# Patient Record
Sex: MALE | Race: WHITE | NOT HISPANIC OR LATINO | Employment: FULL TIME | URBAN - NONMETROPOLITAN AREA
[De-identification: names, ages, dates, MRNs, and addresses within clinical notes are randomized per-mention and may not be internally consistent; named-entity substitution may affect disease eponyms.]

---

## 2019-02-28 ENCOUNTER — TRANSCRIBE ORDERS (OUTPATIENT)
Dept: ADMINISTRATIVE | Facility: HOSPITAL | Age: 24
End: 2019-02-28

## 2019-02-28 ENCOUNTER — HOSPITAL ENCOUNTER (OUTPATIENT)
Dept: GENERAL RADIOLOGY | Facility: HOSPITAL | Age: 24
Discharge: HOME | End: 2019-02-28
Admitting: NURSE PRACTITIONER

## 2019-02-28 DIAGNOSIS — R05.8 COUGH PRESENT FOR GREATER THAN 3 WEEKS: Primary | ICD-10-CM

## 2019-02-28 DIAGNOSIS — R05.9 COUGH: Primary | ICD-10-CM

## 2019-02-28 PROCEDURE — 71046 X-RAY EXAM CHEST 2 VIEWS: CPT

## 2019-03-08 ENCOUNTER — HOSPITAL ENCOUNTER (OUTPATIENT)
Dept: PULMONOLOGY | Facility: HOSPITAL | Age: 24
Discharge: HOME | End: 2019-03-08
Admitting: NURSE PRACTITIONER

## 2019-03-08 DIAGNOSIS — R05.9 COUGH: ICD-10-CM

## 2019-03-08 PROCEDURE — 94060 EVALUATION OF WHEEZING: CPT

## 2019-03-08 PROCEDURE — 94640 AIRWAY INHALATION TREATMENT: CPT

## 2019-03-08 RX ORDER — ALBUTEROL SULFATE 2.5 MG/3ML
2.5 SOLUTION RESPIRATORY (INHALATION) ONCE
Status: COMPLETED | OUTPATIENT
Start: 2019-03-08 | End: 2019-03-08

## 2019-03-08 RX ADMIN — ALBUTEROL SULFATE 2.5 MG: 2.5 SOLUTION RESPIRATORY (INHALATION) at 09:16

## 2019-08-16 ENCOUNTER — OFFICE VISIT (OUTPATIENT)
Dept: SURGERY | Facility: CLINIC | Age: 24
End: 2019-08-16

## 2019-08-16 VITALS
HEIGHT: 70 IN | HEART RATE: 108 BPM | WEIGHT: 241.8 LBS | OXYGEN SATURATION: 98 % | SYSTOLIC BLOOD PRESSURE: 138 MMHG | DIASTOLIC BLOOD PRESSURE: 86 MMHG | BODY MASS INDEX: 34.62 KG/M2 | TEMPERATURE: 99.6 F

## 2019-08-16 DIAGNOSIS — S90.852A: Primary | ICD-10-CM

## 2019-08-16 DIAGNOSIS — L08.9: Primary | ICD-10-CM

## 2019-08-16 PROCEDURE — 99203 OFFICE O/P NEW LOW 30 MIN: CPT | Performed by: SURGERY

## 2019-08-16 PROCEDURE — 10121 INC&RMVL FB SUBQ TISS COMP: CPT | Performed by: SURGERY

## 2019-08-16 NOTE — PROGRESS NOTES
Subjective   Jitendra Lane is a 24 y.o. male.   Chief Complaint   Patient presents with   • Foreign Body       History of Present Illness   Mr. Lane is a 24-year-old male patient who presents for evaluation of a possible foreign body of the left foot.  He reports stepping on a broken glass [place] ornament about 4 months ago, with known puncture of the foot by several shards of glass.  Most of these were removed by the patient at home that he has had progressive pain in the foot radiating up the leg, worsening as he walks.  He has felt that he had a retained foreign body within the foot and has tried multiple modalities at home to remove this.  This is included needles, tweezers, and squeezing the area.  All have been unsuccessful.  He can hardly bear weight on the foot due to pain.  He denies redness or drainage.  He was recently seen at urgent care, and x-rays were formed.  He is here today for surgical evaluation.    The following portions of the patient's history were reviewed and updated as appropriate: allergies, current medications, past family history, past medical history, past social history, past surgical history and problem list.    Review of Systems   Constitutional: Negative for chills, fever and unexpected weight change.   HENT: Negative for trouble swallowing and voice change.    Eyes: Negative for visual disturbance.   Respiratory: Negative for apnea, cough, chest tightness, shortness of breath and wheezing.    Cardiovascular: Negative for chest pain, palpitations and leg swelling.   Gastrointestinal: Negative for abdominal distention, abdominal pain, anal bleeding, blood in stool, constipation, diarrhea, nausea, rectal pain and vomiting.   Endocrine: Negative for cold intolerance and heat intolerance.   Genitourinary: Negative for difficulty urinating, dysuria, flank pain, scrotal swelling and testicular pain.   Musculoskeletal: Negative for back pain, gait problem and joint swelling.  "  Skin: Positive for wound. Negative for color change and rash.   Neurological: Negative for dizziness, syncope, speech difficulty, weakness, numbness and headaches.   Hematological: Negative for adenopathy. Does not bruise/bleed easily.   Psychiatric/Behavioral: Negative for confusion. The patient is not nervous/anxious.        Objective    /86   Pulse 108   Temp 99.6 °F (37.6 °C)   Ht 177.8 cm (70\")   Wt 110 kg (241 lb 12.8 oz)   SpO2 98%   BMI 34.69 kg/m²     Physical Exam   Constitutional: He is oriented to person, place, and time. He appears well-developed and well-nourished.   HENT:   Head: Normocephalic and atraumatic.   Cardiovascular: Regular rhythm.   Pulmonary/Chest: Effort normal.   Neurological: He is alert and oriented to person, place, and time.   Skin: Skin is warm and dry.   On the plantar aspect of the left foot there is an area of obvious puncture with a palpable abnormality consistent with foreign body.   Psychiatric: He has a normal mood and affect. His behavior is normal.       Assessment/Plan   Jitendra was seen today for foreign body.    Diagnoses and all orders for this visit:    Superficial foreign body of foot with infection, left, initial encounter      Mr. Lane is a 24-year-old male patient with an obvious retained foreign body on the plantar surface of the left foot.  He was counseled regarding the need for removal.  We discussed this being done in the office under local and he is agreeable.  He was taken to the procedure room and consent was obtained.  He was placed supine on the procedure table in the foot was prepped and draped in usual sterile fashion.  The area in question was anesthetized with lidocaine and an 11 blade knife was used to open the skin overlying the palpable abnormality.  Hemostats were used to open the wound slightly and the palpable fragment of glass was grasped and removed with hemostats.  Several additional palpable pieces of glass were identified " therefore I elected to excise a core of soft tissue around the palpable abnormalities.  Once this was accomplished, no additional abnormalities were identified in the soft tissue.  The wound was irrigated and then dressed.  The patient was provided with wound care instructions.  We will follow-up with me as needed.  He was given detailed return precautions.